# Patient Record
Sex: MALE | Race: BLACK OR AFRICAN AMERICAN | NOT HISPANIC OR LATINO | Employment: FULL TIME | URBAN - METROPOLITAN AREA
[De-identification: names, ages, dates, MRNs, and addresses within clinical notes are randomized per-mention and may not be internally consistent; named-entity substitution may affect disease eponyms.]

---

## 2023-01-02 ENCOUNTER — OFFICE VISIT (OUTPATIENT)
Dept: URGENT CARE | Facility: URGENT CARE | Age: 33
End: 2023-01-02
Payer: COMMERCIAL

## 2023-01-02 ENCOUNTER — ANCILLARY PROCEDURE (OUTPATIENT)
Dept: GENERAL RADIOLOGY | Facility: CLINIC | Age: 33
End: 2023-01-02
Attending: PHYSICIAN ASSISTANT
Payer: COMMERCIAL

## 2023-01-02 VITALS
OXYGEN SATURATION: 100 % | SYSTOLIC BLOOD PRESSURE: 102 MMHG | TEMPERATURE: 96.8 F | DIASTOLIC BLOOD PRESSURE: 67 MMHG | RESPIRATION RATE: 16 BRPM | HEART RATE: 58 BPM

## 2023-01-02 DIAGNOSIS — K59.09 CHRONIC CONSTIPATION: Primary | ICD-10-CM

## 2023-01-02 DIAGNOSIS — R10.9 STOMACH ACHE: ICD-10-CM

## 2023-01-02 DIAGNOSIS — K59.09 CHRONIC CONSTIPATION: ICD-10-CM

## 2023-01-02 LAB
ALBUMIN SERPL-MCNC: 3.6 G/DL (ref 3.4–5)
ALP SERPL-CCNC: 74 U/L (ref 40–150)
ALT SERPL W P-5'-P-CCNC: 25 U/L
ANION GAP SERPL CALCULATED.3IONS-SCNC: 3 MMOL/L (ref 3–14)
AST SERPL W P-5'-P-CCNC: 28 U/L (ref 0–45)
BASOPHILS # BLD AUTO: 0 10E3/UL (ref 0–0.2)
BASOPHILS NFR BLD AUTO: 0 %
BILIRUB SERPL-MCNC: 0.8 MG/DL (ref 0.2–1.3)
BUN SERPL-MCNC: 13 MG/DL (ref 7–30)
CALCIUM SERPL-MCNC: 9.3 MG/DL (ref 8.5–10.1)
CHLORIDE BLD-SCNC: 105 MMOL/L (ref 94–109)
CO2 SERPL-SCNC: 31 MMOL/L (ref 20–32)
CREAT SERPL-MCNC: 0.7 MG/DL (ref 0.66–1.25)
EOSINOPHIL # BLD AUTO: 0 10E3/UL (ref 0–0.7)
EOSINOPHIL NFR BLD AUTO: 1 %
ERYTHROCYTE [DISTWIDTH] IN BLOOD BY AUTOMATED COUNT: 11.8 % (ref 10–15)
GFR SERPL CREATININE-BSD FRML MDRD: >90 ML/MIN/1.73M2
GLUCOSE BLD-MCNC: 90 MG/DL (ref 70–99)
HCT VFR BLD AUTO: 40.8 % (ref 40–53)
HGB BLD-MCNC: 13.4 G/DL (ref 13.3–17.7)
LYMPHOCYTES # BLD AUTO: 1.4 10E3/UL (ref 0.8–5.3)
LYMPHOCYTES NFR BLD AUTO: 47 %
MCH RBC QN AUTO: 28.9 PG (ref 26.5–33)
MCHC RBC AUTO-ENTMCNC: 32.8 G/DL (ref 31.5–36.5)
MCV RBC AUTO: 88 FL (ref 78–100)
MONOCYTES # BLD AUTO: 0.3 10E3/UL (ref 0–1.3)
MONOCYTES NFR BLD AUTO: 9 %
NEUTROPHILS # BLD AUTO: 1.3 10E3/UL (ref 1.6–8.3)
NEUTROPHILS NFR BLD AUTO: 42 %
PLATELET # BLD AUTO: 195 10E3/UL (ref 150–450)
POTASSIUM BLD-SCNC: 4.3 MMOL/L (ref 3.4–5.3)
PROT SERPL-MCNC: 7.2 G/DL (ref 6.8–8.8)
RBC # BLD AUTO: 4.64 10E6/UL (ref 4.4–5.9)
SODIUM SERPL-SCNC: 139 MMOL/L (ref 133–144)
WBC # BLD AUTO: 3 10E3/UL (ref 4–11)

## 2023-01-02 PROCEDURE — 36415 COLL VENOUS BLD VENIPUNCTURE: CPT | Performed by: PHYSICIAN ASSISTANT

## 2023-01-02 PROCEDURE — 74019 RADEX ABDOMEN 2 VIEWS: CPT | Mod: TC | Performed by: RADIOLOGY

## 2023-01-02 PROCEDURE — 80053 COMPREHEN METABOLIC PANEL: CPT | Performed by: PHYSICIAN ASSISTANT

## 2023-01-02 PROCEDURE — 85025 COMPLETE CBC W/AUTO DIFF WBC: CPT | Performed by: PHYSICIAN ASSISTANT

## 2023-01-02 PROCEDURE — 99203 OFFICE O/P NEW LOW 30 MIN: CPT | Performed by: PHYSICIAN ASSISTANT

## 2023-01-02 RX ORDER — POLYETHYLENE GLYCOL 3350 17 G/17G
1 POWDER, FOR SOLUTION ORAL DAILY
Qty: 578 G | Refills: 0 | Status: SHIPPED | OUTPATIENT
Start: 2023-01-02

## 2023-01-02 NOTE — PROGRESS NOTES
Assessment & Plan     Chronic constipation    Flat and upright abdomen Negative for acute findings, read by Yoni RICO at time of visit.    Patient has a lot of stool and hard stools consistent with chronic constipation    CBC normal  CMP normal    Trial course of mirlax and increased fruits and vegetables  Patient gets a lot of gas with fiber  I have referred him to GI for further discussion of his constipation and food intolerances    Constipation can have many causes. These include:    Diet low in fiber    Too much dairy    Not drinking enough liquids    Lack of exercise or physical activity (especially true for older adults)    Changes in lifestyle or daily routine, including pregnancy, aging, work, and travel    Frequent use or misuse of laxatives    Ignoring the urge to have a bowel movement or delaying it until later    - Comprehensive metabolic panel (BMP + Alb, Alk Phos, ALT, AST, Total. Bili, TP); Future  - CBC with platelets and differential; Future  - XR Abdomen 2 Views; Future  - Adult GI  Referral - Consult Only; Future  - polyethylene glycol (MIRALAX) 17 GM/Dose powder; Take 17 g (1 capful) by mouth daily    Stomach ache    \CBC normal  CMP normal    Stomach ache is likely from constipation  Information given to patient    - Comprehensive metabolic panel (BMP + Alb, Alk Phos, ALT, AST, Total. Bili, TP); Future  - CBC with platelets and differential; Future  - Comprehensive metabolic panel (BMP + Alb, Alk Phos, ALT, AST, Total. Bili, TP)  - CBC with platelets and differential    Review of external notes as documented elsewhere in note         CONSULTATION/REFERRAL to GI    No follow-ups on file.    Yoni Fletcher, [facility], PA-ARNOLDO  [facility] Hospital[facility]ALL Turcios is a 32 year old, presenting for the following health issues:  GI Problem (PT has been having stomach problems )      HPI   Review of Systems   Constitutional, HEENT, cardiovascular, pulmonary, GI,  , musculoskeletal, neuro, skin, endocrine and psych systems are negative, except as otherwise noted.      Objective    /67   Pulse 58   Temp 96.8  F (36  C) (Tympanic)   Resp 16   SpO2 100%   There is no height or weight on file to calculate BMI.  Physical Exam   GENERAL: healthy, alert and no distress  EYES: Eyes grossly normal to inspection, PERRL and conjunctivae and sclerae normal  HENT: ear canals and TM's normal, nose and mouth without ulcers or lesions  NECK: no adenopathy, no asymmetry, masses, or scars and thyroid normal to palpation  RESP: lungs clear to auscultation - no rales, rhonchi or wheezes  CV: regular rate and rhythm, normal S1 S2, no S3 or S4, no murmur, click or rub, no peripheral edema and peripheral pulses strong  ABDOMEN: soft, nontender, no hepatosplenomegaly, no masses and bowel sounds normal  MS: no gross musculoskeletal defects noted, no edema  SKIN: no suspicious lesions or rashes  NEURO: Normal strength and tone, mentation intact and speech normal  PSYCH: mentation appears normal, affect normal/bright        Results for orders placed or performed in visit on 01/02/23   XR Abdomen 2 Views     Status: None    Narrative    ABDOMEN TWO VIEWS 1/2/2023 12:46 PM     HISTORY: Chronic constipation.    COMPARISON: None.      Impression    IMPRESSION: Moderate amount of stool throughout the colon. Bowel gas  pattern is otherwise within normal limits. No evidence for bowel  obstruction. No free intraperitoneal air.    RENE DAVIS MD         SYSTEM ID:  [id number]   Results for orders placed or performed in visit on 01/02/23   Comprehensive metabolic panel (BMP + Alb, Alk Phos, ALT, AST, Total. Bili, TP)     Status: None   Result Value Ref Range    Sodium 139 133 - 144 mmol/L    Potassium 4.3 3.4 - 5.3 mmol/L    Chloride 105 94 - 109 mmol/L    Carbon Dioxide (CO2) 31 20 - 32 mmol/L    Anion Gap 3 3 - 14 mmol/L    Urea Nitrogen 13 7 - 30 mg/dL    Creatinine 0.70 0.66 - 1.25 mg/dL     Calcium 9.3 8.5 - 10.1 mg/dL    Glucose 90 70 - 99 mg/dL    Alkaline Phosphatase 74 40 - 150 U/L    AST 28 0 - 45 U/L    ALT 25 U/L    Protein Total 7.2 6.8 - 8.8 g/dL    Albumin 3.6 3.4 - 5.0 g/dL    Bilirubin Total 0.8 0.2 - 1.3 mg/dL    GFR Estimate >90 >60 mL/min/1.73m2   CBC with platelets and differential     Status: Abnormal   Result Value Ref Range    WBC Count 3.0 (L) 4.0 - 11.0 10e3/uL    RBC Count 4.64 4.40 - 5.90 10e6/uL    Hemoglobin 13.4 13.3 - 17.7 g/dL    Hematocrit 40.8 40.0 - 53.0 %    MCV 88 78 - 100 fL    MCH 28.9 26.5 - 33.0 pg    MCHC 32.8 31.5 - 36.5 g/dL    RDW 11.8 10.0 - 15.0 %    Platelet Count 195 150 - 450 10e3/uL    % Neutrophils 42 %    % Lymphocytes 47 %    % Monocytes 9 %    % Eosinophils 1 %    % Basophils 0 %    Absolute Neutrophils 1.3 (L) 1.6 - 8.3 10e3/uL    Absolute Lymphocytes 1.4 0.8 - 5.3 10e3/uL    Absolute Monocytes 0.3 0.0 - 1.3 10e3/uL    Absolute Eosinophils 0.0 0.0 - 0.7 10e3/uL    Absolute Basophils 0.0 0.0 - 0.2 10e3/uL   CBC with platelets and differential     Status: Abnormal    Narrative    The following orders were created for panel order CBC with platelets and differential.  Procedure                               Abnormality         Status                     ---------                               -----------         ------                     CBC with platelets and d...[[id number]]  Abnormal            Final result                 Please view results for these tests on the individual orders.

## 2023-01-11 NOTE — TELEPHONE ENCOUNTER
REFERRAL INFORMATION:    Referring Provider:  Yoni Fletcher PA-C    Referring Clinic:  Internal    Reason for Visit/Diagnosis: Chronic constipation [K59.09]  - Primary      FUTURE VISIT INFORMATION:    Appointment Date: 1/17/2023     NOTES STATUS DETAILS   OFFICE NOTE from Referring Provider Internal 1/2/2032 Urgent care visit with ANNELISE Fletcher   OFFICE NOTE from Other Specialist Care Everywhere 12/3/2018 [facility] [place] with BRIJESH Heard   HOSPITAL DISCHARGE SUMMARY/  ED VISITS N/A    OPERATIVE REPORT N/A    MEDICATION LIST Internal         ENDOSCOPY  N/A    COLONOSCOPY N/A    ERCP N/A    EUS N/A    STOOL TESTING N/A    PERTINENT LABS N/A    PATHOLOGY REPORTS (RELATED) N/A    IMAGING (CT, MRI, EGD, MRCP, Small Bowel Follow Through/SBT, MR/CT Enterography) Internal XR:   1/2/2023 Abdomen

## 2023-01-17 ENCOUNTER — PRE VISIT (OUTPATIENT)
Dept: GASTROENTEROLOGY | Facility: CLINIC | Age: 33
End: 2023-01-17

## 2023-01-17 ENCOUNTER — OFFICE VISIT (OUTPATIENT)
Dept: GASTROENTEROLOGY | Facility: CLINIC | Age: 33
End: 2023-01-17
Payer: COMMERCIAL

## 2023-01-17 VITALS
SYSTOLIC BLOOD PRESSURE: 122 MMHG | WEIGHT: 138.4 LBS | BODY MASS INDEX: 18.34 KG/M2 | DIASTOLIC BLOOD PRESSURE: 64 MMHG | HEIGHT: 73 IN

## 2023-01-17 DIAGNOSIS — R14.0 ABDOMINAL BLOATING: Primary | ICD-10-CM

## 2023-01-17 DIAGNOSIS — K59.09 CHRONIC CONSTIPATION: ICD-10-CM

## 2023-01-17 PROCEDURE — 99204 OFFICE O/P NEW MOD 45 MIN: CPT | Performed by: NURSE PRACTITIONER

## 2023-01-17 RX ORDER — SIMETHICONE 125 MG
125 TABLET,CHEWABLE ORAL 2 TIMES DAILY
Qty: 60 TABLET | Refills: 3 | Status: SHIPPED | OUTPATIENT
Start: 2023-01-17

## 2023-01-17 NOTE — PROGRESS NOTES
"    Gastroenterology CLINIC VISIT, NEW PATIENT    CC/REFERRING PROVIDER: Yoni Fletcher  REASON FOR CONSULTATION: chronic constipation    HPI: 32 year old male was referred to GI clinic for consultation on chronic constipation.  Patient reported ongoing issues with \"constipation\" for many years, but it got worse in the past few months.  Patient stated that as a [employer], he needs to have clean body prior to a prayer.  Complains that he has lots of bloating and sensation of incomplete evacuation every day. He reported spending approximately one hour in the bathroom to empty his bowels and to get rid of gas to be clean.  Complains of left-sided abdominal discomfort prior to defecation and straining.  He describes his stools as soft and small most of the times. Sometimes,they are firm or formed but still small.  No black stools or blood per rectum.   He eats 3 small meals because his symptoms get worse after a large meal. Admits to drinking less than 8 cups of fluids a day.  He stopped consuming certain foods that cause excessive flatulence and bloating such as broccoli, banana, beans, cabbage, kale, barley, and wheat.  Describes symptoms of lactose intolerance.  He was screened negative for celiac disease back in 2018, when he had similar symptoms. Said that he also had colonoscopy in a different state at that time.  No health records are available to review, but patient stated he was told to avoid spicy foods.  Patient denies acid reflux, swallowing problems, nausea, vomiting, or significant changes in his weight.  Patient does not take any medications.  He was prescribed MiraLAX by another provider, but he only tried it 1 or 2 times and discontinue the medication thinking it is not working.  He denies smoking, alcohol use, or drug use.  Denies family history of GI malignancy or IBD.      ROS: 10pt ROS performed and otherwise negative.    PAST MEDICAL HISTORY:  No past medical history on file.    PREVIOUS " "ABDOMINAL/GYNECOLOGIC SURGERIES:  No past surgical history on file.      PERTINENT MEDICATIONS:  Current Outpatient Medications   Medication Sig Dispense Refill     psyllium (METAMUCIL/KONSYL) 58.6 % powder Take 15 g by mouth daily Start with 4-5 gram (one teaspoon) with supper. Slowly increase the dose over a few weeks to 15 gram or one tablespoon. 660 g 3     simethicone (MYLICON) 125 MG chewable tablet Take 1 tablet (125 mg) by mouth 2 times daily Take it before a meal for bloating 60 tablet 3     polyethylene glycol (MIRALAX) 17 GM/Dose powder Take 17 g (1 capful) by mouth daily (Patient not taking: Reported on 1/17/2023) 578 g 0     No other OTC/herbal/supplements reported by patient.    SOCIAL HISTORY:  Social History     Socioeconomic History     Marital status: Single     Spouse name: Not on file     Number of children: Not on file     Years of education: Not on file     Highest education level: Not on file   Occupational History     Not on file   Tobacco Use     Smoking status: Never     Smokeless tobacco: Never   Substance and Sexual Activity     Alcohol use: Not on file     Drug use: Not on file     Sexual activity: Not on file   Other Topics Concern     Not on file   Social History Narrative     Not on file     Social Determinants of Health     Financial Resource Strain: Not on file   Food Insecurity: Not on file   Transportation Needs: Not on file   Physical Activity: Not on file   Stress: Not on file   Social Connections: Not on file   Intimate Partner Violence: Not on file   Housing Stability: Not on file       FAMILY HISTORY:  Denies colon/panc/esophageal/other GI CA, no other Ansari or other HPS-related Alejandro. No IBD/celiac, no other AI/liver/thyroid disease.    PHYSICAL EXAMINATION:  Vitals reviewed  /64   Ht 1.854 m (6' 1\")   Wt 62.8 kg (138 lb 6.4 oz)   BMI 18.26 kg/m      General: Patient appears well in no acute distress.   Skin: No visualized rash or lesions on visualized skin  Eyes: " EOMI, no erythema, sclera icterus or discharge noted  Resp: breathing comfortably without accessory muscle usage, speaking in full sentences, no cough  Lung sounds clear  Card: Regular and rhythmic S1 and S2. No murmur,gallop, or rub  Abdomen: Active bowel sounds X 4 quadrants. Soft to palpation, no guarding or rebound tenderness   MSK: Appears to have normal range of motion based on visualized movements  Neurologic: No apparent tremors, facial movements symmetric  Psych: affect normal, alert and oriented      PERTINENT STUDIES Reviewed in EMR    ASSESSMENT/PLAN:  32 year old male  presented to GI clinic for a consultation on chronic constipation, but the patient's symptoms are more consistent with functional dyspepsia or IBS-C.  Patient has documentated negative screening for celiac disease in 2018. He also mentioned having colonoscopy about 10 years ago, but it was done in another state. No records available to review.  Recent x-ray of abdomen showed moderate stool burden in the colon.Laboratory work was unremarkable-normal CMP and CBC with exception of mild leukocytopenia (WBC at 3.0).  We had a long discussion on possible causes of constipation and on correlation between constipation and reduced food intake, insufficient dietary fiber, exercise, and fluid intake.  Suggested to increase water and food intake. Do not skip meals.  I recommended to restart MiraLAX at 9 to 10 g in the morning and to gradually increase the dose to 17 g a day.  Explained to the patient that it could take a few days MiraLAX to start working.  Recommended a trial of Metamucil, again starting with a small dose of one teaspoon and gradually tapering it up to one tablespoon.  Simethicone was order to take as needed for bloating.  If no improvement in symptoms in 3 months, will proceed with upper and lower GI endoscopy and will work on management of altered stool pattern.      ICD-10-CM    1. Abdominal bloating  R14.0 simethicone (MYLICON)  125 MG chewable tablet      2. Chronic constipation  K59.09 Adult GI  Referral - Consult Only     psyllium (METAMUCIL/KONSYL) 58.6 % powder        RTC in 3 months    Thank you for this consultation. It was a pleasure to participate in the care of this patient; please contact us with any further questions.    RAMONA Caal, FNP-C  [facility]  Gastroenterology Department  [place], [place]    This note was created with Dragon voice recognition software, and while reviewed for accuracy, inadvertent minor typographic errors may occur. Please contact the provider if you have any questions.

## 2023-01-17 NOTE — PATIENT INSTRUCTIONS
It was a pleasure taking care of you today.  I've included a brief summary of our discussion and care plan from today's visit below.  Please review this information with your primary care provider.  ______________________________________________________________________    My recommendations are summarized as follows:    Start Miralax or similar medication with a half of the recommended dose and slowly increase it to prescribed dose. Take it in the morning.    2. Start fiber supplement, Metamucil, with one teaspoon at supper and slowly increase the dose to one tablespoon a day.      3. Increase water intake to 7-8 cups a day. Take one cup of cool/cold water in the morning.    4. Please review low FODMAP diet below and try avoiding foods from high FODMAP list as they cause bloating.    Return to GI Clinic in 3months  to review your progress.    ______________________________________________________________________    BLOATING AND GAS  Some people feel that they pass too much gas or burp too frequently, both of which can be a source of embarrassment and discomfort. The average adult produces about one to three pints of gas each day, which is passed through the anus 14 to 23 times per day. Burping occasionally before or after meals is also normal.  The amount of gas produced by the body depends upon your diet and other individual factors. However, most people who complain of excessive gas do not produce more gas than the average person. Instead, they are more aware of normal amounts of gas. On the other hand, certain foods and medical conditions can cause you to make excessive amounts of gas.    There are two primary sources of intestinal gas: gas that is ingested (mostly swallowed air) and gas that is produced by bacteria in the colon.   Air swallowing is the major source of gas in the stomach. It is normal to swallow a small amount of air when eating and drinking and when swallowing saliva. You may swallow larger  amounts of air when eating food rapidly, gulping liquids, chewing gum, or smoking.     Bacterial production -- The colon normally provides a home for billions of harmless bacteria, some of which support the health of the bowel. Certain carbohydrates are incompletely digested by enzymes in the stomach and intestines, allowing bacteria to digest them. For example, cabbage, [place] sprouts, and broccoli contain raffinose, a carbohydrate that is poorly digested. These foods tend to cause more gas and flatulence because the raffinose is digested by bacteria once it reaches the colon. The by-products of this process include odorless gases, such as carbon dioxide, hydrogen, and methane. Minor components of gas have an unpleasant odor, including trace amounts of sulfur.  Some people are not able to digest certain carbohydrates. A classic example is lactose, the major sugar contained in dairy products . Thus, consuming large amounts of lactose may lead to increased gas production, along with cramping and diarrhea.  Starch and soluble fiber can also contribute increase gas. Potatoes, corn, noodles, and wheat produce gas, while rice does not. Soluble fiber (found in oat bran, peas and other legumes, beans, and most fruit) also causes gas. Some laxatives contain soluble fiber and may cause gas, particularly during the first few weeks of use.   Certain diseases can also cause excessive bloating and gas. For example, people with diabetes or scleroderma may, over time, have slowing in the activity of the small intestine. This can lead to bacterial overgrowth within the bowel, with poor digestion of carbohydrates and other nutrients. However, even in the absence of apparent disease, some people tend to harbor large numbers of bacteria in their small bowel and are prone to develop excessive gas.   Most people with gas and bloating do not need to have any testing. However, symptoms such as diarrhea, weight loss, abdominal pain,  "anemia, blood in the stool, lack of appetite, fever, or vomiting can be warning signs of a more serious problem; people with one or more of these symptoms usually require testing.     Several measures can help to reduce bothersome gas.   Chronic, repeated belching can occur if you swallow large amounts of air (ie, aerophagia). Aerophagia is typically an unconscious process, and is often associated with emotional stress. Treatment focuses on decreasing air swallowing by reducing anxiety, when it is considered to be a cause, as well as on eating slowly without gulping and avoiding carbonated beverages, chewing gum, and smoking.   Diet recommendations --   Certain foods contain specific carbohydrates called \"FODMAPs\" (fermentable oligo-, di-, and monosaccharides and polyols). FODMAPs are poorly absorbed and can result in bloating and gas production in some people. For more information and a list of foods, please check the https://[employer] website      Avoid foods that appear to aggravate your symptoms. These may include milk and dairy products, certain fruits or vegetables, whole grains, artificial sweeteners, and/or carbonated beverages.If you are lactose intolerant, do not consume products that contain lactose;  you can use a lactose-digestive aid such as lactose-reduced milk or over-the-counter lactase supplement (eg, Lactaid tablets or liquid).    Over-the-counter medications -- Try an over-the-counter product that contains Simethicone, such as certain antacids (eg, Maalox Anti-Gas, Mylanta Gas, Gas-X, Phazyme). Also, you can try an over-the-counter product that contains activated charcoal (eg, CharcoCaps, CharcoAid) or Beano, which is an over-the-counter preparation that helps to breakdown certain complex carbohydrates. This treatment may be effective in reducing gas after eating beans or other vegetables that contain raffinose. Another option is  Pepto-Bismol to reduce the odor of unpleasant-smelling " gas.    FODMAP:  The FODMAP term was coined by [place] researchers Lala Chauhan and Zhou Kennedy. They found that a low-FODMAP diet helped 75-85% of patients with irritable bowel syndrome or IBS.  Products containing lactose (dairy), fructose (fruit sugar),sweeteners (sorbitol, mannitol), fructans (a type of fiber found in wheat, onions, garlic and chicory root), and GOS (a type of fiber found in beans, hummus and soy milk) are examples of FODMAPs.  They can be poorly absorbed during the digestive process. They are rapidly fermented by the bacteria that live in your gut. They are capable of pulling fluid into the gut in a process called osmosis. The increased fluid load, along with the type and amount of gas produced, cause distension and motility changes, leading to bloating, abdominal pain, diarrhea, and nausea. Symptoms are often delayed until hours after eating a high FODMAP meal or snack, because it takes time for FODMAPs to make their way through the stomach and into the intestines, where the effects occur. By reducing the overall dietary load of these carbohydrates, troublesome GI symptoms can be minimized or eliminated.     A low-FODMAP diet avoids foods containing certain sugars and certain fibers capable of causing diarrhea, constipation, gas, bloating and abdominal pain in people with IBS. At the same time, I would recommend not to exclude all of the FODMAPs foods completely from your diet, but instead, to use alternative foods from the same group. Try the diet for 4-6 weeks, if you have not noticed significant changes in your symptoms, stop the diet. Discuss further plan with your primary care provider or gastroenterology provider.              A high-fiber diet is a commonly recommended treatment for digestive problems, such as constipation, diarrhea, and hemorrhoids.   Most dietary fiber is not digested or absorbed, so it stays within the intestine where it modulates digestion of other foods  and affects the consistency of stool. There are two types of fiber, each of which is thought to have its own benefits:  ?Soluble fiber consists of a group of substances that is made of carbohydrates and dissolves in water. Examples of foods that contain soluble fiber include fruits, oats, barley, and legumes (peas and beans).  ?Insoluble fiber comes from plant cell walls and does not dissolve in water. Examples of foods that contain insoluble fiber include wheat, rye, and other grains. Insoluble fiber (wheat bran, and some fruits and vegetables) has been recommended to treat digestive problems such as constipation, hemorrhoids, chronic diarrhea, and fecal incontinence.   ?Dietary fiber is the sum of all soluble and insoluble fiber.Fiber bulks the stool, making it softer and easier to pass. Fiber helps the stool pass regularly, although it is not a laxative.       - Recommended daily dose of fiber is 25-35 gram. It is difficult to consume this amount of fiber from food alone. Therefore, I would suggest to take fiber supplement.  - Please start supplementation with a powdered soluble fiber. When used on a daily basis, this can help regulate the consistency of your stools.   - Metamucil (psyllium) and Citrucel are preferred examples. You can start with 1-2 teaspoons per day, with goal to gradually increase the dose  to 1 tablespoon daily. You can increase up to 1 tablespoon three times daily if needed.   - It is important to stay well-hydrated with use of fiber supplementation and to make sure that the fiber powder is well mixed with water as directed on the label.   - You may experience some bloating with initiation of fiber, which will improve over the first few weeks. We will evaluate the effect  of fiber in 3-6 months.   - Of note, many of the fiber products contain artificial sweeteners, which can cause bloating, gas, and diarrhea in those who may be sensitive to artificial sweeteners. If this is the case, I would  recommend trying Metamucil Premium Blend (with Stevia), Metamucil 4-in-1 without Added Sweeteners, or Bellway (sweetened with Monk fruit extract).          ______________________________________________________________________    Who do I call with any questions after my visit?  Please be in touch if there are any further questions that arise following today's visit.  There are multiple ways to contact your gastroenterology care team.      During business hours, you may reach a Gastroenterology nurse at [phone], option [phone].     To schedule or reschedule an appointment, please call [phone].   To schedule your lab work at [facility], please call [phone]    You can always send a secure message through Cortex.  Cortex messages are answered by your nurse or doctor typically within 24 hours.  Please allow extra time on weekends and holidays.      For urgent/emergent questions after business hours, you may reach the on-call GI Fellow by contacting the [facility]  at [phone].    In order for your refill to be processed in a timely fashion, it is your responsibility to ensure you follow the recommendations from your provider regarding your laboratory studies and follow up appointments.       How will I get the results of any tests ordered?    You will receive all of your results.  If you have signed up for Cortex, any tests ordered at your visit will be available to you after your physician reviews them.  Typically this takes 1-2 weeks.  If there are urgent results that require a change in your care plan, your physician or nurse will call you to discuss the next steps.   What is Cortex?  Cortex is a secure way for you to access all of your healthcare records from the [facility].  It is a web based computer program, so you can sign on to it from any location.  It also allows you to send secure messages to your care team.  I recommend signing up  for [employer] access if you have not already done so and are comfortable with using a computer.    How to I schedule a follow-up visit?  If you did not schedule a follow-up visit today, please call [phone] to schedule a follow-up office visit.      Sincerely,  MODESTO [facility],  Division of Gastroenterology   ([facility])

## 2023-01-19 ENCOUNTER — TELEPHONE (OUTPATIENT)
Dept: GASTROENTEROLOGY | Facility: CLINIC | Age: 33
End: 2023-01-19
Payer: COMMERCIAL

## 2023-01-19 DIAGNOSIS — K59.09 CHRONIC CONSTIPATION: Primary | ICD-10-CM

## 2023-01-19 DIAGNOSIS — R14.0 ABDOMINAL BLOATING: ICD-10-CM

## 2023-01-19 NOTE — TELEPHONE ENCOUNTER
[facility] Call Center    Phone Message    May a detailed message be left on voicemail: yes     Reason for Call: Order(s): Other: Colonoscopy  Reason for requested: Patient decided to go for procedure  Date needed: ASAP  Provider name: Helen Blake      Action Taken: Message routed to:  [facility] ([facility]):  Gastro Care Pool    Travel Screening: Not Applicable

## 2023-01-19 NOTE — TELEPHONE ENCOUNTER
Please review and sign pended orders.     Shima Beatty RN    Yes, make sure she is taking antibioitc with food and see if she wants a temporary sleep aid

## 2023-01-20 DIAGNOSIS — R14.0 ABDOMINAL BLOATING: ICD-10-CM

## 2023-01-20 DIAGNOSIS — K59.09 CHRONIC CONSTIPATION: Primary | ICD-10-CM

## 2023-01-23 ENCOUNTER — HOSPITAL ENCOUNTER (OUTPATIENT)
Facility: CLINIC | Age: 33
End: 2023-01-23
Attending: INTERNAL MEDICINE | Admitting: INTERNAL MEDICINE
Payer: COMMERCIAL

## 2023-01-23 ENCOUNTER — TELEPHONE (OUTPATIENT)
Dept: GASTROENTEROLOGY | Facility: CLINIC | Age: 33
End: 2023-01-23
Payer: COMMERCIAL

## 2023-01-23 NOTE — TELEPHONE ENCOUNTER
"    Screening Questions  BLUE  KIND OF PREP RED  LOCATION [review exclusion criteria] GREEN  SEDATION TYPE        Y Are you active on mychart?       Helen Blake, RAMONA CNP    Ordering/Referring Provider?        [facility] What type of coverage do you have?      N Have you had a positive covid test in the last 14 days?     18.2 1. BMI  [BMI 40+ - review exclusion criteria]    Y  2. Are you able to give consent for your medical care? [IF NO,RN REVIEW]          N  3. Are you taking any prescription pain medications on a routine schedule   (ex narcotics: tramadol, oxycodone, roxicodone, oxycontin,  and percocet)?          3a. EXTENDED PREP What kind of prescription?     N 4. Do you have any chemical dependencies such as alcohol, street drugs, or methadone?        **If yes 3- 5 , please schedule with MAC sedation.**          IF YES TO ANY 6 - 10 - HOSPITAL SETTING ONLY.     N 6.   Do you need assistance transferring?     N 7.   Have you had a heart or lung transplant?    N 8.   Are you currently on dialysis?   N 9.   Do you use daily home oxygen?   N 10. Do you take nitroglycerin?   10a.  If yes, how often?     11. [FEMALES]  N/A Are you currently pregnant?    11a.  If yes, how many weeks? [ Greater than 12 weeks, OR NEEDED]    N 12. Do you have Pulmonary Hypertension? *NEED PAC APPT AT [facility]*     N 13. [review exclusion criteria]  Do you have any implantable devices in your body (pacemaker, defib, LVAD)?    N 14. In the past 6 months, have you had any heart related issues including cardiomyopathy or heart attack?     14a.  If yes, did it require cardiac stenting if so when?     N 15. Have you had a stroke or Transient ischemic attack (TIA - aka  mini stroke ) within 6 months?      N 16. Do you have mod to severe Obstructive Sleep Apnea?  [Hospital only]    N 17. Do you have SEVERE AND UNCONTROLLED asthma? *NEED PAC APPT AT [facility]*     N 18. Are you currently taking any blood thinners?     18a. If yes, inform patient to \"follow " "up w/ ordering provider for bridging instructions.\"    N 19. Do you take the medication Phentermine?    19a. If yes, \"Hold for 7 days before procedure.  Please consult your prescribing provider if you have questions about holding this medication.\"     N  20. Do you have chronic kidney disease?      N  21. Do you have a diagnosis of diabetes?     Y  22. On a regular basis do you go 3-5 days between bowel movements?      23. Preferred LOCAL Pharmacy for Pre Prescription    [ LIST ONLY ONE PHARMACY]       [id number] - [place], [place] - [address] [facility] [address]      - CLOSING REMINDERS -    Informed patient they will need an adult    Cannot take any type of public or medical transportation alone    Conscious Sedation- Needs  for 6 hours after the procedure       MAC/General-Needs  for 24 hours after procedure    Pre-Procedure Covid test to be completed [[facility] PCR Testing Required]    Confirmed Nurse will call to complete assessment       - SCHEDULING DETAILS -  NO Hospital Setting Required? If yes, what is the exclusion?:    MARK  Surgeon    3/1  Date of Procedure  Lower Endoscopy [Colonoscopy]  Type of Procedure Scheduled  [facility]- [facility] - If you answer yes to questions #1, #3, #22 ([place] and CF pts) CONSTIPATION - Which Colonoscopy Prep was Sent?     MODERATE Sedation Type     N PAC / Pre-op Required                 "

## 2023-02-17 ENCOUNTER — TELEPHONE (OUTPATIENT)
Dept: GASTROENTEROLOGY | Facility: CLINIC | Age: 33
End: 2023-02-17

## 2023-02-17 DIAGNOSIS — K59.09 CHRONIC CONSTIPATION: Primary | ICD-10-CM

## 2023-02-17 RX ORDER — BISACODYL 5 MG/1
TABLET, DELAYED RELEASE ORAL
Qty: 4 TABLET | Refills: 0 | Status: SHIPPED | OUTPATIENT
Start: 2023-02-17 | End: 2024-01-11

## 2023-02-17 NOTE — TELEPHONE ENCOUNTER
Attempted to contact patient regarding upcoming Colonoscopy  procedure on 3.1.23 for pre assessment questions. No answer.     Left message to return call to [phone] #4    Inquire if patient would like to receive instructions via email - needs extended golytely prep instructions versus standard prep that was sent via letter.     Maddi Elizabeth RN  Endoscopy Procedure Pre Assessment RN

## 2023-02-17 NOTE — TELEPHONE ENCOUNTER
Patient scheduled for Colonoscopy  on 3.1.23.     Discuss Covid policy.     Pre op exam scheduled: N/A    Arrival time: 0745. Procedure time 0845    Facility location: [facility]; [address], [place] [address]    Sedation type: Conscious sedation     Anticoagulations? No    Electronic implanted devices? No    Diabetic? No    Indication for procedure: Chronic constipation    Bowel prep recommendation: Extended prep Golytely     Prep instructions sent via letter however the standard golytely was sent. Extended Bowel prep script sent to [id number] - [place], [place] - [address] AT [facility] [address]    Pre visit planning completed.    Maddi Elizabeth RN  Endoscopy Procedure Pre Assessment RN

## 2023-02-22 NOTE — TELEPHONE ENCOUNTER
Second attempt for pre-assessment prior to upcoming colonoscopy     No answer.  Left message to return call [phone] #4    Deepthi Hood RN  Endoscopy Procedure Pre Assessment RN

## 2023-02-27 NOTE — TELEPHONE ENCOUNTER
Pt called back, he has not started dietary changes for bowel prep.     Due to constipation, pt will reschedule. Pt is driving. Sent message to scheduling to call pt to reschedule.     Deepthi Hood RN

## 2023-02-28 ENCOUNTER — TELEPHONE (OUTPATIENT)
Dept: GASTROENTEROLOGY | Facility: CLINIC | Age: 33
End: 2023-02-28
Payer: COMMERCIAL

## 2023-02-28 NOTE — TELEPHONE ENCOUNTER
Caller: Left  and sent letter to [facility]  Reason for Reschedule/Cancellation (please be detailed, any staff messages or encounters to note?): Deepthi Hood RN  P Endoscopy Scheduling Pool; Deepthi Hood RN  Hi,   Please call pt and reschedule his colonoscopy due to not starting prep in time.   Thank you,   Deepthi Hood RN         Prior to reschedule please review:    Ordering Provider:Blake    Sedation per order:CS    Does patient have any ASC Exclusions, please identify?: no      Notes on Cancelled Procedure:    Procedure:Lower Endoscopy [Colonoscopy]     Date: 3/1/23    Location:[facility]; [address], [place] [address]    Surgeon: Kolby        Rescheduled: No  Case in depot

## 2023-03-02 NOTE — TELEPHONE ENCOUNTER
03/02/2023 - 2nd att -- ctl.    Caller: outbound call to pt Fathi VASQUEZ Karlo   Reason for Reschedule/Cancellation (please be detailed, any staff messages or encounters to note?): Deepthi Hood RN  P Endoscopy Scheduling Pool; Deepthi Hood RN  Hi,   Please call pt and reschedule his colonoscopy due to not starting prep in time.   Thank you,   Deepthi Hood RN            Prior to reschedule please review:    Ordering Provider:Blake    Sedation per order:CS    Does patient have any ASC Exclusions, please identify?: no         Notes on Cancelled Procedure:    Procedure:Lower Endoscopy [Colonoscopy]     Date: 3/1/23    Location:[facility]; [address], [address], [place] [address]    Surgeon: Kolby         Rescheduled:     No - lvmtcb -- awaiting pt call back to reschedule     **Per initial scheduling --  confirmed that pt (Declined) upper endoscopy portion of order and would like to proceed only with (Colonoscopy)     Letter sent -- CTL.     NH

## 2023-03-02 NOTE — TELEPHONE ENCOUNTER
Caller:   Reason for Reschedule/Cancellation (please be detailed, any staff messages or encounters to note?): PREP      Prior to reschedule please review:    Ordering Provider:MERISSA GARCIA    Sedation per order:CS    Does patient have any ASC Exclusions, please identify?:       Notes on Cancelled Procedure:    Procedure:Lower Endoscopy [Colonoscopy]     Date: 3/3    Location:[facility]; [address] [place] [address]    Surgeon: RUSSEL        Rescheduled: Yes    Procedure: Lower Endoscopy [Colonoscopy]     Date: 5/1    Location:[facility]; [address] [place] [address]    Surgeon: KAUSHIK    Sedation Level Scheduled  MAC ,  Reason for Sedation Level PH    Prep/Instructions updated and sent: NO

## 2023-04-28 ENCOUNTER — TELEPHONE (OUTPATIENT)
Dept: FAMILY MEDICINE | Facility: CLINIC | Age: 33
End: 2023-04-28
Payer: COMMERCIAL

## 2023-04-28 NOTE — TELEPHONE ENCOUNTER
Please advise if able to help with what the CPT code would be? Lala Mata LPN    General Call    Contacts       Type Contact Phone/Fax    04/28/2023 04:28 PM CDT Phone (Incoming) [facility]ufus [employer] [phone]        Reason for Call:  Colonoscopy procedure on 5/1/23 needing pre authorization, (Authorization # is [id number])    What are your questions or concerns:  Rufus calling from patients insurance, needing DX codes and CPT code for the procedure for billing. Unable to give information on CPT code. Did give the DX codes.     Rufus was able to pull previous CPT code from colonoscopy completed back in February as CPT code 14166.  We attempted to contact billing on what the code is, office was closed    Billing number was given to Rufus and he did state he would try and call first thing Monday morning as patients procedure is at 11:30 am on 5/1/23

## 2023-04-30 ENCOUNTER — NURSE TRIAGE (OUTPATIENT)
Dept: NURSING | Facility: CLINIC | Age: 33
End: 2023-04-30
Payer: COMMERCIAL

## 2023-04-30 NOTE — TELEPHONE ENCOUNTER
Nurse Triage SBAR    Situation:   -GI prep    Background:   -Patient calling, It is okay to leave a detailed message at this number.     Assessment:   -patient has a coloscopy tomorrow at 10AM  -he was supposed to started the prep yesterday, but he was only able to get the Golytely this evening and has not yet started the prep    Recommendation:   -call back in the AM to re-schedule the procedure    JONH RODRIGUEZ RN on 4/30/2023 at 4:38 PM    Reason for Disposition    Health Information question, no triage required and triager able to answer question    Protocols used: INFORMATION ONLY CALL - NO TRIAGE-A-

## 2023-05-01 ENCOUNTER — ANESTHESIA EVENT (OUTPATIENT)
Dept: GASTROENTEROLOGY | Facility: CLINIC | Age: 33
End: 2023-05-01
Payer: COMMERCIAL

## 2023-05-01 ENCOUNTER — HOSPITAL ENCOUNTER (OUTPATIENT)
Facility: CLINIC | Age: 33
Discharge: HOME | End: 2023-05-01
Attending: FAMILY MEDICINE | Admitting: FAMILY MEDICINE
Payer: COMMERCIAL

## 2023-05-01 ENCOUNTER — ANESTHESIA (OUTPATIENT)
Dept: GASTROENTEROLOGY | Facility: CLINIC | Age: 33
End: 2023-05-01
Payer: COMMERCIAL

## 2023-05-01 VITALS
DIASTOLIC BLOOD PRESSURE: 86 MMHG | TEMPERATURE: 97.5 F | RESPIRATION RATE: 16 BRPM | SYSTOLIC BLOOD PRESSURE: 100 MMHG | OXYGEN SATURATION: 100 % | HEART RATE: 81 BPM

## 2023-05-01 LAB — COLONOSCOPY: NORMAL

## 2023-05-01 PROCEDURE — 258N000003 HC RX IP 258 OP 636: Performed by: NURSE ANESTHETIST, CERTIFIED REGISTERED

## 2023-05-01 PROCEDURE — 250N000009 HC RX 250: Performed by: NURSE ANESTHETIST, CERTIFIED REGISTERED

## 2023-05-01 PROCEDURE — 250N000011 HC RX IP 250 OP 636: Performed by: NURSE ANESTHETIST, CERTIFIED REGISTERED

## 2023-05-01 PROCEDURE — 45378 DIAGNOSTIC COLONOSCOPY: CPT | Performed by: FAMILY MEDICINE

## 2023-05-01 PROCEDURE — 370N000017 HC ANESTHESIA TECHNICAL FEE, PER MIN: Performed by: FAMILY MEDICINE

## 2023-05-01 RX ORDER — PROPOFOL 10 MG/ML
INJECTION, EMULSION INTRAVENOUS CONTINUOUS PRN
Status: DISCONTINUED | OUTPATIENT
Start: 2023-05-01 | End: 2023-05-01

## 2023-05-01 RX ORDER — ONDANSETRON 2 MG/ML
4 INJECTION INTRAMUSCULAR; INTRAVENOUS
Status: DISCONTINUED | OUTPATIENT
Start: 2023-05-01 | End: 2023-05-01 | Stop reason: HOSPADM

## 2023-05-01 RX ORDER — ONDANSETRON 2 MG/ML
4 INJECTION INTRAMUSCULAR; INTRAVENOUS EVERY 6 HOURS PRN
Status: DISCONTINUED | OUTPATIENT
Start: 2023-05-01 | End: 2023-05-01 | Stop reason: HOSPADM

## 2023-05-01 RX ORDER — NALOXONE HYDROCHLORIDE 0.4 MG/ML
0.2 INJECTION, SOLUTION INTRAMUSCULAR; INTRAVENOUS; SUBCUTANEOUS
Status: DISCONTINUED | OUTPATIENT
Start: 2023-05-01 | End: 2023-05-01 | Stop reason: HOSPADM

## 2023-05-01 RX ORDER — FENTANYL CITRATE 50 UG/ML
50 INJECTION, SOLUTION INTRAMUSCULAR; INTRAVENOUS
Status: DISCONTINUED | OUTPATIENT
Start: 2023-05-01 | End: 2023-05-01 | Stop reason: HOSPADM

## 2023-05-01 RX ORDER — PROCHLORPERAZINE MALEATE 5 MG
10 TABLET ORAL EVERY 6 HOURS PRN
Status: DISCONTINUED | OUTPATIENT
Start: 2023-05-01 | End: 2023-05-01 | Stop reason: HOSPADM

## 2023-05-01 RX ORDER — LIDOCAINE 40 MG/G
CREAM TOPICAL
Status: DISCONTINUED | OUTPATIENT
Start: 2023-05-01 | End: 2023-05-01 | Stop reason: HOSPADM

## 2023-05-01 RX ORDER — SODIUM CHLORIDE, SODIUM LACTATE, POTASSIUM CHLORIDE, CALCIUM CHLORIDE 600; 310; 30; 20 MG/100ML; MG/100ML; MG/100ML; MG/100ML
INJECTION, SOLUTION INTRAVENOUS CONTINUOUS PRN
Status: DISCONTINUED | OUTPATIENT
Start: 2023-05-01 | End: 2023-05-01

## 2023-05-01 RX ORDER — NALOXONE HYDROCHLORIDE 0.4 MG/ML
0.4 INJECTION, SOLUTION INTRAMUSCULAR; INTRAVENOUS; SUBCUTANEOUS
Status: DISCONTINUED | OUTPATIENT
Start: 2023-05-01 | End: 2023-05-01 | Stop reason: HOSPADM

## 2023-05-01 RX ORDER — FLUMAZENIL 0.1 MG/ML
0.2 INJECTION, SOLUTION INTRAVENOUS
Status: DISCONTINUED | OUTPATIENT
Start: 2023-05-01 | End: 2023-05-01 | Stop reason: HOSPADM

## 2023-05-01 RX ORDER — SODIUM CHLORIDE, SODIUM LACTATE, POTASSIUM CHLORIDE, CALCIUM CHLORIDE 600; 310; 30; 20 MG/100ML; MG/100ML; MG/100ML; MG/100ML
INJECTION, SOLUTION INTRAVENOUS CONTINUOUS
Status: DISCONTINUED | OUTPATIENT
Start: 2023-05-01 | End: 2023-05-01 | Stop reason: HOSPADM

## 2023-05-01 RX ORDER — ONDANSETRON 4 MG/1
4 TABLET, ORALLY DISINTEGRATING ORAL EVERY 6 HOURS PRN
Status: DISCONTINUED | OUTPATIENT
Start: 2023-05-01 | End: 2023-05-01 | Stop reason: HOSPADM

## 2023-05-01 RX ORDER — LIDOCAINE HYDROCHLORIDE 10 MG/ML
INJECTION, SOLUTION INFILTRATION; PERINEURAL PRN
Status: DISCONTINUED | OUTPATIENT
Start: 2023-05-01 | End: 2023-05-01

## 2023-05-01 RX ADMIN — PROPOFOL 200 MCG/KG/MIN: 10 INJECTION, EMULSION INTRAVENOUS at 11:51

## 2023-05-01 RX ADMIN — PROPOFOL 50 MG: 10 INJECTION, EMULSION INTRAVENOUS at 11:53

## 2023-05-01 RX ADMIN — LIDOCAINE HYDROCHLORIDE 50 MG: 10 INJECTION, SOLUTION INFILTRATION; PERINEURAL at 11:51

## 2023-05-01 RX ADMIN — SODIUM CHLORIDE, POTASSIUM CHLORIDE, SODIUM LACTATE AND CALCIUM CHLORIDE: 600; 310; 30; 20 INJECTION, SOLUTION INTRAVENOUS at 11:17

## 2023-05-01 RX ADMIN — SODIUM CHLORIDE, POTASSIUM CHLORIDE, SODIUM LACTATE AND CALCIUM CHLORIDE: 600; 310; 30; 20 INJECTION, SOLUTION INTRAVENOUS at 11:50

## 2023-05-01 ASSESSMENT — ACTIVITIES OF DAILY LIVING (ADL)
ADLS_ACUITY_SCORE: 35
ADLS_ACUITY_SCORE: 35

## 2023-05-01 NOTE — ANESTHESIA CARE TRANSFER NOTE
Patient: Karolina Dietrich    Procedure: Procedure(s):  Colonoscopy       Diagnosis: Chronic constipation [K59.09]  Diagnosis Additional Information: No value filed.    Anesthesia Type:   MAC     Note:    Oropharynx: oropharynx clear of all foreign objects and spontaneously breathing  Level of Consciousness: drowsy  Oxygen Supplementation: room air    Independent Airway: airway patency satisfactory and stable  Dentition: dentition unchanged  Vital Signs Stable: post-procedure vital signs reviewed and stable  Report to RN Given: handoff report given  Patient transferred to: Phase II    Handoff Report: Identifed the Patient, Identified the Reponsible Provider, Reviewed the pertinent medical history, Discussed the surgical course, Reviewed Intra-OP anesthesia mangement and issues during anesthesia, Set expectations for post-procedure period and Allowed opportunity for questions and acknowledgement of understanding      Vitals:  Vitals Value Taken Time   BP 80/57 05/01/23 1220   Temp     Pulse 61 05/01/23 1220   Resp     SpO2 100 % 05/01/23 1226   Vitals shown include unvalidated device data.    Electronically Signed By: RAMONA Tapia CRNA  May 1, 2023  12:27 PM

## 2023-05-01 NOTE — DISCHARGE INSTRUCTIONS
[facility]    Home Care Following Endoscopy          Activity:  You have just undergone an endoscopic procedure usually performed with conscious sedation.  Do not work or operate machinery (including a car) for at least 12 hours.    I encourage you to walk and attempt to pass this air as soon as possible.    Diet:  Return to the diet you were on before your procedure but eat lightly for the first 12-24 hours.  Drink plenty of water.  Resume any regular medications unless otherwise advised by your physician.  Please begin any new medication prescribed as a result of your procedure as directed by your physician.   If you had any biopsy or polyp removed please refrain from aspirin or aspirin products for 2 days.  If on Coumadin please restart as instructed by your physician.   Pain:  You may take Tylenol as needed for pain.  Expected Recovery:  You can expect some mild abdominal fullness and/or discomfort due to the air used to inflate your intestinal tract.     Call Your Physician if You Have:    After Colonoscopy:  Worsening persisting abdominal pain which is worse with activity.  Fevers (>101 degrees F), chills or shakes.  Passage of continued blood with bowel movements.     Any questions or concerns about your recovery, please call [phone] or after hours [phone] ([phone]) Nurse Advice Line.    Follow-up Care:  You did NOT have polyps/biopsy tissue sample(s) removed.       Call [phone]  with any concerns

## 2023-05-01 NOTE — ANESTHESIA PREPROCEDURE EVALUATION
Anesthesia Pre-Procedure Evaluation    Patient: Karolina Dietrich   MRN: [id number] : 1990        Procedure : Procedure(s):  Colonoscopy          No past medical history on file.   No past surgical history on file.   No Known Allergies   Social History     Tobacco Use     Smoking status: Never     Smokeless tobacco: Never   Vaping Use     Vaping status: Not on file   Substance Use Topics     Alcohol use: Not on file      Wt Readings from Last 1 Encounters:   23 62.8 kg (138 lb 6.4 oz)        Anesthesia Evaluation   Pt has not had prior anesthetic         ROS/MED HX  ENT/Pulmonary:  - neg pulmonary ROS     Neurologic:  - neg neurologic ROS     Cardiovascular:       METS/Exercise Tolerance:     Hematologic:       Musculoskeletal:       GI/Hepatic: Comment: Functional dyspepsia vs IBS    (+) bowel prep,     Renal/Genitourinary:  - neg Renal ROS     Endo:  - neg endo ROS     Psychiatric/Substance Use:  - neg psychiatric ROS     Infectious Disease:       Malignancy:  - neg malignancy ROS     Other:            Physical Exam    Airway  airway exam normal      Mallampati: II   TM distance: > 3 FB   Neck ROM: full   Mouth opening: > 3 cm    Respiratory Devices and Support         Dental           Cardiovascular   cardiovascular exam normal       Rhythm and rate: regular and normal     Pulmonary   pulmonary exam normal        breath sounds clear to auscultation           OUTSIDE LABS:  CBC:   Lab Results   Component Value Date    WBC 3.0 (L) 2023    HGB 13.4 2023    HCT 40.8 2023     2023     BMP:   Lab Results   Component Value Date     2023    POTASSIUM 4.3 2023    CHLORIDE 105 2023    CO2 31 2023    BUN 13 2023    CR 0.70 2023    GLC 90 2023     COAGS: No results found for: PTT, INR, FIBR  POC: No results found for: BGM, HCG, HCGS  HEPATIC:   Lab Results   Component Value Date    ALBUMIN 3.6 2023    PROTTOTAL 7.2 2023     ALT 25 01/02/2023    AST 28 01/02/2023    ALKPHOS 74 01/02/2023    BILITOTAL 0.8 01/02/2023     OTHER:   Lab Results   Component Value Date    CARSON 9.3 01/02/2023       Anesthesia Plan    ASA Status:  1   NPO Status:  NPO Appropriate    Anesthesia Type: MAC.     - Reason for MAC: straight local not clinically adequate   Induction: Intravenous, Propofol.   Maintenance: TIVA.        Consents    Anesthesia Plan(s) and associated risks, benefits, and realistic alternatives discussed. Questions answered and patient/representative(s) expressed understanding.    - Discussed:     - Discussed with:  Patient      - Extended Intubation/Ventilatory Support Discussed: No.      - Patient is DNR/DNI Status: No    Use of blood products discussed: No .     Postoperative Care    Pain management: Oral pain medications.   PONV prophylaxis: Background Propofol Infusion     Comments:    Other Comments: The risks and benefits of anesthesia, and the alternatives where applicable, have been discussed with the patient, and they wish to proceed.            RAMONA Tapia CRNA

## 2023-05-01 NOTE — TELEPHONE ENCOUNTER
Prior Authorization's for high tech imaging are handled by the [employer].  If you need assistance or have questions on these authorizations,  please call  [phone].     All other Prior Authorizations are done in clinic by Provider and/or Care Team placing the order.  Possibly the GI ?     Thank you,     Karin/JORGE L Referrals

## 2023-05-01 NOTE — ANESTHESIA POSTPROCEDURE EVALUATION
Patient: Karolina Dietrich    Procedure: Procedure(s):  Colonoscopy       Anesthesia Type:  MAC    Note:  Disposition: Outpatient   Postop Pain Control: Uneventful            Sign Out: Well controlled pain   PONV: No   Neuro/Psych: Uneventful            Sign Out: Acceptable/Baseline neuro status   Airway/Respiratory: Uneventful            Sign Out: Acceptable/Baseline resp. status   CV/Hemodynamics: Uneventful            Sign Out: Acceptable CV status   Other NRE: NONE   DID A NON-ROUTINE EVENT OCCUR? No    Event details/Postop Comments:  Pt was happy with anesthesia care.  No complications.  I will follow up with the pt if needed.           Last vitals:  Vitals Value Taken Time   BP 80/57 05/01/23 1220   Temp     Pulse 61 05/01/23 1220   Resp     SpO2 100 % 05/01/23 1227   Vitals shown include unvalidated device data.    Electronically Signed By: RAMONA Tapia CRNA  May 1, 2023  12:28 PM

## 2023-05-01 NOTE — H&P
"Pre-Endoscopy History and Physical     Karolina Dietrich MRN# [id number]   YOB: 1990 Age: 33 year old     Date of Procedure: (Not on file)  Primary care provider: No Ref-Primary, Physician  Type of Endoscopy: colonoscopy  Type of Anesthesia Anticipated: MAC     HPI:    Karolina is a 33 year old male who was referred to me for colonoscopy.    Karolina is feeling well today. We discussed today's colonoscopy in the pre-op area.    There is no problem list on file for this patient.         There were no vitals taken for this visit.   Estimated body mass index is 18.26 kg/m  as calculated from the following:    Height as of 1/17/23: 1.854 m (6' 1\").    Weight as of 1/17/23: 62.8 kg (138 lb 6.4 oz).    GENERAL APPEARANCE: alert and oriented and NAD  See anesthesia exam      Assessment/Plan   ASA Class 1    Plan for colonoscopy. No medical contraindications to proceed, or further work up needed. The risks and benefits of the procedure and the sedation options and risks were discussed with the patient. These include infection, bleeding, and small risk of colon perforation (1/1000 to 1/66607 depending on patient characteristics and type of procedure). Karolina was also explained to alternatives for colo-rectal screening. All questions were answered and informed consent was obtained.      Thank you kindly for the referral and opportunity to provide CRC screening      Signed Electronically by: Solomon Rodriguez MD  May 1, 2023       "

## 2024-01-11 ENCOUNTER — OFFICE VISIT (OUTPATIENT)
Dept: FAMILY MEDICINE | Facility: CLINIC | Age: 34
End: 2024-01-11
Payer: COMMERCIAL

## 2024-01-11 ENCOUNTER — ANCILLARY PROCEDURE (OUTPATIENT)
Dept: GENERAL RADIOLOGY | Facility: CLINIC | Age: 34
End: 2024-01-11
Attending: NURSE PRACTITIONER
Payer: COMMERCIAL

## 2024-01-11 VITALS
DIASTOLIC BLOOD PRESSURE: 72 MMHG | HEIGHT: 73 IN | HEART RATE: 67 BPM | TEMPERATURE: 97.2 F | OXYGEN SATURATION: 99 % | WEIGHT: 138.4 LBS | BODY MASS INDEX: 18.34 KG/M2 | RESPIRATION RATE: 18 BRPM | SYSTOLIC BLOOD PRESSURE: 108 MMHG

## 2024-01-11 DIAGNOSIS — R10.32 LLQ ABDOMINAL PAIN: Primary | ICD-10-CM

## 2024-01-11 DIAGNOSIS — K59.09 CHRONIC CONSTIPATION: ICD-10-CM

## 2024-01-11 DIAGNOSIS — Z28.20 VACCINE REFUSED BY PATIENT: ICD-10-CM

## 2024-01-11 PROBLEM — D70.9 NEUTROPENIA (H): Status: ACTIVE | Noted: 2018-11-26

## 2024-01-11 PROBLEM — D64.9 NORMOCYTIC ANEMIA: Status: ACTIVE | Noted: 2018-11-26

## 2024-01-11 PROBLEM — M51.369 LUMBAR DEGENERATIVE DISC DISEASE: Status: ACTIVE | Noted: 2020-12-31

## 2024-01-11 LAB
ALBUMIN SERPL BCG-MCNC: 4.3 G/DL (ref 3.5–5.2)
ALP SERPL-CCNC: 62 U/L (ref 40–150)
ALT SERPL W P-5'-P-CCNC: 19 U/L (ref 0–70)
ANION GAP SERPL CALCULATED.3IONS-SCNC: 8 MMOL/L (ref 7–15)
AST SERPL W P-5'-P-CCNC: 26 U/L (ref 0–45)
BILIRUB SERPL-MCNC: 0.4 MG/DL
BUN SERPL-MCNC: 11.5 MG/DL (ref 6–20)
CALCIUM SERPL-MCNC: 9.2 MG/DL (ref 8.6–10)
CHLORIDE SERPL-SCNC: 101 MMOL/L (ref 98–107)
CREAT SERPL-MCNC: 0.78 MG/DL (ref 0.67–1.17)
DEPRECATED HCO3 PLAS-SCNC: 28 MMOL/L (ref 22–29)
EGFRCR SERPLBLD CKD-EPI 2021: >90 ML/MIN/1.73M2
GLUCOSE SERPL-MCNC: 73 MG/DL (ref 70–99)
LIPASE SERPL-CCNC: 34 U/L (ref 13–60)
POTASSIUM SERPL-SCNC: 3.8 MMOL/L (ref 3.4–5.3)
PROT SERPL-MCNC: 7.7 G/DL (ref 6.4–8.3)
SODIUM SERPL-SCNC: 137 MMOL/L (ref 135–145)

## 2024-01-11 PROCEDURE — 83690 ASSAY OF LIPASE: CPT | Performed by: NURSE PRACTITIONER

## 2024-01-11 PROCEDURE — 80053 COMPREHEN METABOLIC PANEL: CPT | Performed by: NURSE PRACTITIONER

## 2024-01-11 PROCEDURE — 99214 OFFICE O/P EST MOD 30 MIN: CPT | Performed by: NURSE PRACTITIONER

## 2024-01-11 PROCEDURE — 36415 COLL VENOUS BLD VENIPUNCTURE: CPT | Performed by: NURSE PRACTITIONER

## 2024-01-11 PROCEDURE — 74019 RADEX ABDOMEN 2 VIEWS: CPT | Mod: TC | Performed by: RADIOLOGY

## 2024-01-11 ASSESSMENT — PAIN SCALES - GENERAL: PAINLEVEL: NO PAIN (0)

## 2024-01-11 NOTE — PROGRESS NOTES
Assessment & Plan     LLQ abdominal pain  Likely due to chronic constipation, see below, advised referral back to GI    Chronic constipation  High fiber diet discussed in detail, stressed need for increasing the fluids in the diet, getting adequate hydration, reviewed indications for UC/ER visit for new/worsening sx  - Fecal colorectal cancer screen (FIT)  - Comprehensive metabolic panel (BMP + Alb, Alk Phos, ALT, AST, Total. Bili, TP)  - Lipase  - XR Abdomen 2 Views  - Fecal colorectal cancer screen (FIT)  - Comprehensive metabolic panel (BMP + Alb, Alk Phos, ALT, AST, Total. Bili, TP)  - Lipase    Vaccine refused by patient  He declined COVID and flu vaccines      Ordering of each unique test  26 minutes spent by me on the date of the encounter doing chart review, history and exam, documentation and further activities per the note      Regular exercise    Lashonda Turcios is a 33 year old, presenting for the following health issues:  Constipation      1/11/2024     9:20 AM   Additional Questions   Roomed by PIPPA         1/11/2024     9:20 AM   Patient Reported Additional Medications   Patient reports taking the following new medications NONE       CO    History of Present Illness       Reason for visit:  CONSTIPATION  Symptom onset:  More than a month  Symptoms include:  BACKED UP WITH STOOL, PAIN ON LEFT SIDE OF ABDOMEN  Symptom intensity:  Severe  Symptom progression:  Worsening  Had these symptoms before:  Yes  Prior treatment description:  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  He exercises with enough effort to increase his heart rate 3 or less days per week.   He is taking medications regularly.     Stools are sticky to hard, has small stool daily but feels backed up and has LLQ abdominal pain. No  BRBPR, stools are brown, has intermittent lower abdominal cramping th tis relieved with BM. No urinary frequency, urgency, dysuria.    Exam Information    Exam Date Exam Time Accession # Results    5/1/23 11:50 AM [id number]      Linked Documents    View Image     Component Collected Lab   COLONOSCOPY 05/01/2023 11:50 AM [address] [address]  _______________________________________________________________________________  Patient Name: Karolina Dietrich           Procedure Date: 5/1/2023 11:50 AM  MRN: [id number]                       Account Number: [id number]  YOB: 1990              Admit Type: Outpatient  Age: 33                               Room: [place] [address]  Gender: Male                          Note Status: Finalized  Attending MD: NADIA FAULKNER MD,  Total Sedation Time:  _______________________________________________________________________________     Procedure:             Colonoscopy  Indications:           Chronic idiopathic constipation  Providers:             NADIA FAULKNER MD  Referring MD:          MERISSA GARCIA  Medicines:             Monitored Anesthesia Care  Complications:         No immediate complications.  _______________________________________________________________________________  Procedure:             Pre-Anesthesia Assessment:                         - The risks and benefits of the procedure and the                         sedation options and risks were discussed with the                         patient. All questions were answered and informed                         consent was obtained.                         After obtaining informed consent, the colonoscope was                         passed under direct vision. Throughout the procedure,                         the patient's blood pressure, pulse, and oxygen                         saturations were monitored continuously. The                          Colonoscope was introduced through the anus and                         advanced to the cecum, identified by appendiceal                         orifice and ileocecal valve. The colonoscopy was                         performed without difficulty. The patient tolerated                         the procedure well. The quality of the bowel                         preparation was adequate.                                                                                   Findings:       The perianal and digital rectal examinations were normal.       The entire examined colon appeared normal on direct and retroflexion       views.                                                                                   Impression:            - The entire examined colon is normal on direct and                         retroflexion views.                         - No specimens collected.  Recommendation:        - Repeat colonoscopy at age 47 for screening purposes.                         - Discussed adding high fiber to diet and PRN miralax.                                                                                   Procedure Code(s):       --- Professional ---       93119, Colonoscopy, flexible; diagnostic, including collection of       specimen(s) by brushing or washing, when performed (separate procedure)    CPT copyright 2021 [employer]. All rights reserved.    The codes documented in this report are preliminary and upon  review may  be revised to meet current compliance requirements.    Nadia Rodriguez MD  ________________________  NADIA RODRIGUEZ MD  5/1/2023 12:13:14 PM  I was physically present for the entire viewing portion of the exam.NADIA RODRIGUEZ MD  Number of Addenda: 0    Note Initiated On: 5/1/2023 11:50 AM         Review of Systems   Constitutional, HEENT, cardiovascular, pulmonary, gi and gu systems are negative, except as otherwise noted.      Objective   "  /72 (BP Location: Left arm, Patient Position: Sitting, Cuff Size: Adult Regular)   Pulse 67   Temp 97.2  F (36.2  C) (Tympanic)   Resp 18   Ht 1.854 m (6' 1\")   Wt 62.8 kg (138 lb 6.4 oz)   SpO2 99%   BMI 18.26 kg/m    Body mass index is 18.26 kg/m .  Physical Exam   GENERAL: alert and no distress  EYES: Eyes grossly normal to inspection, PERRL and conjunctivae and sclerae normal  HENT: ear canals and TM's normal, nose and mouth without ulcers or lesions  NECK: no adenopathy, no asymmetry, masses, or scars  RESP: lungs clear to auscultation - no rales, rhonchi or wheezes  CV: regular rate and rhythm, normal S1 S2, no S3 or S4, no murmur, click or rub, no peripheral edema  ABDOMEN: soft, nontender, no hepatosplenomegaly, no masses and bowel sounds normal  MS: no gross musculoskeletal defects noted, no edema  BACK: no CVA tenderness, no paralumbar tenderness  PSYCH: mentation appears normal, affect normal/bright  LYMPH: normal ant/post cervical, supraclavicular nodes  inguinal: no adenopathy    Results for orders placed or performed in visit on 01/11/24   XR Abdomen 2 Views     Status: None    Narrative    ABDOMEN 2 VIEWS   1/11/2024 10:15 AM     HISTORY: Chronic constipation, LLQ abdominal pain.    COMPARISON: 1/2/2023.      Impression    IMPRESSION: Nonobstructive bowel. No free air. Moderate stool within  the colon, volume appearing similar to the prior exam. Clear lower  lungs.    ESTRADA HERNANDEZ MD         SYSTEM ID:  [id number]   Results for orders placed or performed in visit on 01/11/24   Fecal colorectal cancer screen (FIT)     Status: Normal   Result Value Ref Range    Occult Blood Screen FIT Negative Negative   Comprehensive metabolic panel (BMP + Alb, Alk Phos, ALT, AST, Total. Bili, TP)     Status: Normal   Result Value Ref Range    Sodium 137 135 - 145 mmol/L    Potassium 3.8 3.4 - 5.3 mmol/L    Carbon Dioxide (CO2) 28 22 - 29 mmol/L    Anion Gap 8 7 - 15 mmol/L    Urea Nitrogen 11.5 6.0 - 20.0 " mg/dL    Creatinine 0.78 0.67 - 1.17 mg/dL    GFR Estimate >90 >60 mL/min/1.73m2    Calcium 9.2 8.6 - 10.0 mg/dL    Chloride 101 98 - 107 mmol/L    Glucose 73 70 - 99 mg/dL    Alkaline Phosphatase 62 40 - 150 U/L    AST 26 0 - 45 U/L    ALT 19 0 - 70 U/L    Protein Total 7.7 6.4 - 8.3 g/dL    Albumin 4.3 3.5 - 5.2 g/dL    Bilirubin Total 0.4 <=1.2 mg/dL   Lipase     Status: Normal   Result Value Ref Range    Lipase 34 13 - 60 U/L

## 2024-01-11 NOTE — NURSING NOTE
Patient Quality Outreach    Patient is due for the following:   Physical Preventive Adult Physical    Next Steps:   Patient declined follow up at this time.    Type of outreach:    SPOKE TO PATIENT AT APPOINTMENT      Questions for provider review:    None           Elida Andrews MA

## 2024-01-17 PROCEDURE — 82274 ASSAY TEST FOR BLOOD FECAL: CPT | Performed by: NURSE PRACTITIONER

## 2024-01-19 LAB — HEMOCCULT STL QL IA: NEGATIVE

## 2024-03-10 ENCOUNTER — HEALTH MAINTENANCE LETTER (OUTPATIENT)
Age: 34
End: 2024-03-10

## 2025-03-16 ENCOUNTER — HEALTH MAINTENANCE LETTER (OUTPATIENT)
Age: 35
End: 2025-03-16

## (undated) DEVICE — SOL WATER IRRIG 1000ML BOTTLE 2F7114

## (undated) DEVICE — GOWN XLG DISP 9545

## (undated) DEVICE — GLOVE PROTEXIS W/NEU-THERA 7.5  2D73TE75

## (undated) DEVICE — TUBING SUCTION 6"X3/16" N56A

## (undated) DEVICE — KIT ENDO TURNOVER/PROCEDURE CARRY-ON 101822